# Patient Record
Sex: MALE | Race: WHITE
[De-identification: names, ages, dates, MRNs, and addresses within clinical notes are randomized per-mention and may not be internally consistent; named-entity substitution may affect disease eponyms.]

---

## 2018-01-09 ENCOUNTER — HOSPITAL ENCOUNTER (OUTPATIENT)
Dept: HOSPITAL 96 - M.CRD | Age: 58
End: 2018-01-09
Attending: INTERNAL MEDICINE
Payer: COMMERCIAL

## 2018-01-09 DIAGNOSIS — I08.3: Primary | ICD-10-CM

## 2018-01-09 NOTE — 2DMMODE
[place] [address]
Phone:  [phone] 2 D/M-MODE ECHOCARDIOGRAM     
_______________________________________________________________________________
 
Name:         LOUIS LEO          Room:                     REG CLI
M.RBrock#:    [id number]     Account #:     [id number]  
Admission:    18    Attend Phys:   Mac Hutchison,
Discharge:                Date of Birth: [birth year]  
Date of Service: 18 1611  Report #:      [id number]
        [id number]
_______________________________________________________________________________
THIS REPORT FOR:  //name//                      
 
 
--------------- APPROVED REPORT --------------
 
 
Study performed:  2018 09:10:22
 
EXAM: Comprehensive 2D, Doppler, and color-flow 
Echocardiogram 
Patient Location: Out-Patient   
      Status:  routine
 
      BSA:         1.90
HR: 84 bpm BP:          140/80 mmHg 
 
Other Information 
Study Quality: Good
 
Indications
Aortic Insuff
 
2D Dimensions
   LVEF(%):  61.14 (&gt;50%)
IVSd:  10.96 (7-11mm) LVOT Diam:  19.98 (18-24mm) 
LVDd:  44.19 mm  
PWd:  11.75 (7-11mm) Ascending Ao:  31.96 (22-36mm)
LVDs:  29.80 (25-40mm) 
Aortic Root:  26.26 mm 
   Hilton's LVEF:  61.14 %
 
Volumes
Left Atrial Volume (Systole) 
    LA ESV Index:  14.10 mL/m2
 
Aortic Valve
AoV Peak Krish.:  1.06 m/s 
AO Peak Gr.:  4.47 mmHg  LVOT Max P.05 mmHg
AO Mean Gr.:  2.48 mmHg  LVOT Mean P.10 mmHg
    LVOT Max V:  0.72 m/s
AO V2 VTI:  20.72 cm  LVOT Mean V:  0.49 m/s
JODIE (VTI):  2.47 cm2  LVOT V1 VTI:  16.36 cm
AI Manati:  4.41 m/s2  
AI PHT:  356.87 ms  
 
Mitral Valve
 
 
[place] [address]
Phone:  [phone]                     2 D/M-MODE ECHOCARDIOGRAM     
_______________________________________________________________________________
 
Name:         LOUIS LEO          Room:                     REG CLI
M.R.#:    [id number]     Account #:     [id number]  
Admission:    18    Attend Phys:   Mac Hutchison,
Discharge:                Date of Birth: [birth year]  
Date of Service: 18 1611  Report #:      [id number]
        [id number]
_______________________________________________________________________________
    E/A Ratio:  1.10
    MV Decel. Time:  136.75 ms
MV E Max Krish.:  0.53 m/s 
MV PHT:  39.66 ms  
MVA (PHT):  5.55 cm2  
 
TDI
E/Lateral E':  5.30 E/Medial E':  5.30
   Medial E' Krish.:  0.10 m/s
   Lateral E' Krish.:  0.10 m/s
 
Pulmonary Valve
PV Peak Krish.:  0.65 m/s PV Peak Gr.:  1.70 mmHg
 
Tricuspid Valve
TR Peak Gr.:  19.15 mmHg RVSP:  24.15 mmHg
 
Left Ventricle
The left ventricle is normal size. There is normal LV segmental wall 
motion. Mild concentric left ventricular hypertrophy. Left 
ventricular systolic function is normal. LVEF is 55-60%. Transmitral 
Doppler flow pattern suggests impaired LV relaxation.
 
Right Ventricle
The right ventricle is normal size. The right ventricular systolic 
function is normal.
 
Atria
The left atrium size is normal. The right atrium size is 
normal.
 
Aortic Valve
The aortic valve is normal in structure. Moderate aortic 
regurgitation. There is no aortic valvular stenosis.
 
Mitral Valve
The mitral valve is normal in structure. Trace mitral regurgitation. 
No evidence of mitral valve stenosis.
 
Tricuspid Valve
The tricuspid valve is normal in structure. Mild tricuspid 
regurgitation. The RVSP is __24.1_____ mmHg.
 
Pulmonic Valve
The pulmonary valve is normal in structure. There is no pulmonic 
valvular regurgitation.
 
 
[place] [address]
Phone:  [phone]                     2 D/M-MODE ECHOCARDIOGRAM     
_______________________________________________________________________________
 
Name:         LOUIS LEO TWAN          Room:                     REG CLI
MBrockBrock#:    [id number]     Account #:     [id number]  
Admission:    18    Attend Phys:   Mac Hutchison,
Discharge:                Date of Birth: [birth year]  
Date of Service: 18 1611  Report #:      [id number]
        [id number]
_______________________________________________________________________________
 
Great Vessels
The aortic root is normal in size. IVC is normal in size and 
collapses with &gt;50% inspiration
 
Pericardium
There is no pericardial effusion.
 
&lt;Conclusion&gt;
The left ventricle is normal size.
Mild concentric left ventricular hypertrophy.
Left ventricular systolic function is normal.
LVEF is 55-60%.
Transmitral Doppler flow pattern suggests impaired LV 
relaxation.
Moderate aortic regurgitation.
Trace mitral regurgitation.
Mild tricuspid regurgitation.
The RVSP is __24.1_____ mmHg.
 
 
 
 
 
 
 
 
 
 
 
 
 
 
 
 
 
 
 
 
 
 
 
 
 
  <ELECTRONICALLY SIGNED>
                                           By: Mac Hutchison MD, FACC   
  18
D: 18   _____________________________________
T: 18   Mac Hutchison MD, FACC     /INF

## 2018-01-23 ENCOUNTER — HOSPITAL ENCOUNTER (OUTPATIENT)
Dept: HOSPITAL 96 - M.CRD | Age: 58
End: 2018-01-23
Attending: INTERNAL MEDICINE
Payer: COMMERCIAL

## 2018-01-23 DIAGNOSIS — R07.2: Primary | ICD-10-CM

## 2018-01-29 NOTE — TST
[place] [address]
Phone:  [phone]                     TREADMILL STRESS TEST         
_______________________________________________________________________________
 
Name:         LOUIS LEO          Room:                     REG CLI
M.R.#:    [id number]     Account #:     [id number]  
Admission:    01/23/18    Attend Phys:   Mac Hutchison,
Discharge:                Date of Birth: [birth year]  
Date of Service: 01/23/18 1538  Report #:      [id number]
        [id number]     
_______________________________________________________________________________
THIS REPORT FOR:  //name//                      
 
CC: Eugenio Damon
 
DATE OF SERVICE:  01/23/2018
 
 
REFERRING PHYSICIAN:  Dr. Perea and Dr. Hutchison.
 
INDICATIONS:  Chest pain.
 
Standard Noe protocol exercise stress test.
 
The patient exercised for 10 minutes and 3 seconds to a maximum heart rate of
167 beats per minute.  The patient achieved 103% maximum predicted heart rate
and an energy expenditure equivalent to 12.62 METS.
 
The baseline blood pressure was 168/88 mmHg with a resting pulse rate of 82
beats per minute.  At peak stress, the blood pressure was 194/84 mmHg with peak
stress heart rate of 167 beats per minute.  In recovery, the blood pressure was
139/77 with a recovery heart rate of 94 beats per minute.
 
Exercise was discontinued due to dyspnea and fatigue.  The patient had no chest
discomfort with exercise.
 
The baseline 12-lead electrocardiogram showed sinus rhythm without significant
ST or T-wave abnormality.  EKGs obtained during and post-exercise showed sinus
rhythm and sinus tachycardia with at first upsloping and then downsloping ST
segment depression at peak exercise of approximately 2 to 2.5 mm.  The ST
segment depression resolved within 1 minute of recovery.  Recovery EKGs were
unremarkable.
 
IMPRESSIONS:
1.  Clinical response, nonischemic.
2.  EKG response ischemic.  The patient did develop some ST segment depression
at peak exercise that resolved quickly in recovery.  Whether this represents
true ischemia or ST segment changes due to elevated blood pressure with exercise
is uncertain.  Consider additional stress testing with alternate imaging
modality.
 
 
 
  <ELECTRONICALLY SIGNED>
                                           By: Mac Hutchison MD, FACC   
  01/29/18     0833
D: 01/23/18 1538   _____________________________________
T: 01/23/18 2218   Mac Hutchison MD, FACC     /nt

## 2018-08-28 ENCOUNTER — HOSPITAL ENCOUNTER (OUTPATIENT)
Dept: HOSPITAL 96 - M.RAD | Age: 58
End: 2018-08-28
Attending: INTERNAL MEDICINE
Payer: COMMERCIAL

## 2018-08-28 DIAGNOSIS — M79.642: Primary | ICD-10-CM

## 2018-08-28 DIAGNOSIS — G89.29: ICD-10-CM

## 2019-10-28 ENCOUNTER — HOSPITAL ENCOUNTER (OUTPATIENT)
Dept: HOSPITAL 96 - M.LAB | Age: 59
End: 2019-10-28
Attending: ANESTHESIOLOGY
Payer: COMMERCIAL

## 2019-10-28 DIAGNOSIS — E87.6: Primary | ICD-10-CM

## 2021-01-28 ENCOUNTER — HOSPITAL ENCOUNTER (EMERGENCY)
Dept: HOSPITAL 96 - M.ERS | Age: 61
Discharge: HOME | End: 2021-01-28
Payer: COMMERCIAL

## 2021-01-28 VITALS — WEIGHT: 164.99 LBS | HEIGHT: 69 IN | BODY MASS INDEX: 24.44 KG/M2

## 2021-01-28 VITALS — SYSTOLIC BLOOD PRESSURE: 126 MMHG | DIASTOLIC BLOOD PRESSURE: 69 MMHG

## 2021-01-28 DIAGNOSIS — R42: Primary | ICD-10-CM

## 2021-01-28 DIAGNOSIS — K21.9: ICD-10-CM

## 2021-01-28 LAB
ABSOLUTE BASOPHILS: 0 THOU/UL (ref 0–0.2)
ABSOLUTE EOSINOPHILS: 0.3 THOU/UL (ref 0–0.7)
ABSOLUTE MONOCYTES: 0.4 THOU/UL (ref 0–1.2)
ALBUMIN SERPL-MCNC: 4 G/DL (ref 3.4–5)
ALP SERPL-CCNC: 90 U/L (ref 46–116)
ALT SERPL-CCNC: 37 U/L (ref 30–65)
ANION GAP SERPL CALC-SCNC: 8 MMOL/L (ref 7–16)
APTT BLD: 26.1 SECONDS (ref 25–31.3)
AST SERPL-CCNC: 14 U/L (ref 15–37)
BASOPHILS NFR BLD AUTO: 0.5 %
BILIRUB SERPL-MCNC: 1.1 MG/DL
BUN SERPL-MCNC: 20 MG/DL (ref 7–18)
CALCIUM SERPL-MCNC: 8.9 MG/DL (ref 8.5–10.1)
CHLORIDE SERPL-SCNC: 103 MMOL/L (ref 98–107)
CK-MB MASS: 0.5 NG/ML
CO2 SERPL-SCNC: 31 MMOL/L (ref 21–32)
CREAT SERPL-MCNC: 1.3 MG/DL (ref 0.6–1.3)
EOSINOPHIL NFR BLD: 5.5 %
GLUCOSE SERPL-MCNC: 106 MG/DL (ref 70–99)
GRANULOCYTES NFR BLD MANUAL: 60.5 %
HCT VFR BLD CALC: 44.3 % (ref 42–52)
HGB BLD-MCNC: 15.5 GM/DL (ref 14–18)
INR PPP: 1
LIPASE: 104 U/L (ref 73–393)
LYMPHOCYTES # BLD: 1.4 THOU/UL (ref 0.8–5.3)
LYMPHOCYTES NFR BLD AUTO: 25.7 %
MAGNESIUM SERPL-MCNC: 2.1 MG/DL (ref 1.8–2.4)
MCH RBC QN AUTO: 32 PG (ref 26–34)
MCHC RBC AUTO-ENTMCNC: 35.1 G/DL (ref 28–37)
MCV RBC: 91.1 FL (ref 80–100)
MONOCYTES NFR BLD: 7.8 %
MPV: 8.5 FL. (ref 7.2–11.1)
NEUTROPHILS # BLD: 3.4 THOU/UL (ref 1.6–8.1)
NT-PRO BRAIN NAT PEPTIDE: 56 PG/ML (ref ?–300)
NUCLEATED RBCS: 0 /100WBC
PLATELET COUNT*: 161 THOU/UL (ref 150–400)
POTASSIUM SERPL-SCNC: 3.6 MMOL/L (ref 3.5–5.1)
PROT SERPL-MCNC: 7 G/DL (ref 6.4–8.2)
PROTHROMBIN TIME: 10.4 SECONDS (ref 9.2–11.5)
RBC # BLD AUTO: 4.86 MIL/UL (ref 4.5–6)
RDW-CV: 14.2 % (ref 10.5–14.5)
SODIUM SERPL-SCNC: 142 MMOL/L (ref 136–145)
WBC # BLD AUTO: 5.6 THOU/UL (ref 4–11)

## 2021-01-28 NOTE — EKG
[place] [address]
Phone:  [phone]                     ELECTROCARDIOGRAM REPORT      
_______________________________________________________________________________
 
Name:         FELIPALOUIS TWAN          Room:                     REG ER 
M.R.#:    [id number]     Account #:     [id number]  
Admission:    21    Attend Phys:                     
Discharge:                Date of Birth: [birth year]  
Date of Service: 21 1440  Report #:      [id number]
        [id number]
_______________________________________________________________________________
THIS REPORT FOR:  //name//                      
 
                         [facility] ED
                                       
Test Date:    2021               Test Time:    14:40:10
Pat Name:     LOUIS LEO       Department:   
Patient ID:   [id number]            Room:          
Gender:                               Technician:   [facility]
:          1960               Requested By: Tee Lackey
Order Number: [id number] MD:   Mac Hutchison
                                 Measurements
Intervals                              Axis          
Rate:         81                       P:            52
SD:           189                      QRS:          45
QRSD:         99                       T:            50
QT:           352                                    
QTc:          409                                    
                           Interpretive Statements
Sinus rhythm
Possible left atrial enlargement
Compared to ECG 10/03/2016 21:46:22
ST (T wave) deviation no longer present
Electronically Signed On 2021 16:05:39 CST by Mac Hutchison
[email or web address]
 
 
 
 
 
 
 
 
 
 
 
 
 
 
 
 
 
 
 
 
  <ELECTRONICALLY SIGNED>
                                           By: Mac Hutchison MD, FACC   
  21     1605
D: 21 1440   _____________________________________
T: 21 1440   Mac Hutchison MD, [facility]     /EPI

## 2021-06-07 ENCOUNTER — HOSPITAL ENCOUNTER (OUTPATIENT)
Dept: HOSPITAL 96 - M.CRD | Age: 61
End: 2021-06-07
Attending: INTERNAL MEDICINE
Payer: COMMERCIAL

## 2021-06-07 DIAGNOSIS — I08.0: Primary | ICD-10-CM

## 2021-06-07 NOTE — 2DMMODE
[place] [address]
Phone:  [phone] 2 D/M-MODE ECHOCARDIOGRAM     
_______________________________________________________________________________
 
Name:         LEO,ROBERT TWAN          Room:                     REG CLI
M.R.#:    [id number]     Account #:     [id number]  
Admission:    21    Attend Phys:   Mac Hutchison,
Discharge:                Date of Birth: [birth year]  
Date of Service: 21 1250  Report #:      [id number]
        [id number]
_______________________________________________________________________________
THIS REPORT FOR:
 
cc:  Raúl Carter MD, Dean L. MD Holkins,Eugenio ATWOOD MD [facility]       
                                                                       ~
 
--------------- APPROVED REPORT --------------
 
 
Study performed:  2021 10:56:38
 
EXAM: Comprehensive 2D, Doppler, and color-flow 
Echocardiogram 
Patient Location: Out-Patient   
 
      BSA:         1.92
HR: 68 bpm BP:          130/70 mmHg 
 
Other Information 
Study Quality: Good
 
Indications
Aortic Valve Insuff.
 
2D Dimensions
IVSd:  10.64 (7-11mm) LVOT Diam:  20.36 (18-24mm) 
LVDd:  48.88 mm  
PWd:  10.22 (7-11mm) Ascending Ao:  31.28 (22-36mm)
LVDs:  28.70 (25-40mm) 
Aortic Root:  26.83 mm 
 
Volumes
Left Atrial Volume (Systole) 
    LA ESV Index:  17.80 mL/m2
 
Aortic Valve
AoV Peak Krish.:  1.18 m/s 
AO Peak Gr.:  5.56 mmHg  LVOT Max PG:  3.47 mmHg
AO Mean Gr.:  2.56 mmHg  LVOT Mean P.46 mmHg
    LVOT Max V:  0.93 m/s
AO V2 VTI:  21.47 cm  LVOT Mean V:  0.54 m/s
JODIE (VTI):  3.05 cm2  LVOT V1 VTI:  20.12 cm
AI McDonald:  4.14 m/s2  
AI PHT:  347.36 ms  
 
 
 
[place] [address]
Phone:  [phone]                     2 D/M-MODE ECHOCARDIOGRAM     
_______________________________________________________________________________
 
Name:         LOUIS LEO          Room:                     REG CLI
M.R.#:    [id number]     Account #:     [id number]  
Admission:    21    Attend Phys:   Mac Hutchison,
Discharge:                Date of Birth: [birth year]  
Date of Service: 21 1250  Report #:      [id number]
        [id number]
_______________________________________________________________________________
Mitral Valve
    E/A Ratio:  1.90
    MV Decel. Time:  149.18 ms
MV E Max Krish.:  0.91 m/s 
MV PHT:  43.26 ms  
MVA (PHT):  5.09 cm2  
 
TDI
E/Lateral E':  8.27 E/Medial E':  8.27
   Medial E' Krish.:  0.11 m/s
   Lateral E' Krish.:  0.11 m/s
 
Pulmonary Valve
PV Peak Krish.:  0.60 m/s PV Peak Gr.:  1.45 mmHg
 
Left Ventricle
The left ventricle is normal size. There is normal LV segmental wall 
motion. There is normal left ventricular wall thickness. Left 
ventricular systolic function is normal. The left ventricular 
ejection fraction is within the normal range. LVEF is 55-60%. The 
left ventricular diastolic function is normal.
 
Right Ventricle
The right ventricle is normal size. The right ventricular systolic 
function is normal.
 
Atria
The left atrium size is normal. The right atrium size is 
normal.
 
Aortic Valve
Mild aortic valve sclerosis. Moderate aortic regurgitation. There is 
no aortic valvular stenosis.
 
Mitral Valve
The mitral valve is normal in structure. Mild to moderate mitral 
regurgitation. No evidence of mitral valve stenosis.
 
Tricuspid Valve
The tricuspid valve is normal in structure. There is no tricuspid 
valve regurgitation noted.
 
Pulmonic Valve
The pulmonary valve is normal in structure. There is no pulmonic 
valvular regurgitation.
 
 
 
[place] [address]
Phone:  [phone]                     2 D/M-MODE ECHOCARDIOGRAM     
_______________________________________________________________________________
 
Name:         LEOLOUIS STILES          Room:                     REG CLI
M.R.#:    [id number]     Account #:     [id number]  
Admission:    21    Attend Phys:   Mac Hutchison,
Discharge:                Date of Birth: [birth year]  
Date of Service: 21 1250  Report #:      [id number]
        [id number]
_______________________________________________________________________________
Great Vessels
The aortic root is normal in size. IVC is normal in size and 
collapses >50% with inspiration.
 
Pericardium
There is no pericardial effusion.
 
<Conclusion>
There is normal left ventricular wall thickness.
Left ventricular systolic function is normal.
The left ventricular ejection fraction is within the normal 
range.
LVEF is 55-60%.
The left ventricular diastolic function is normal.
The right ventricle is normal size.
The left atrium size is normal.
Mild aortic valve sclerosis.
Moderate aortic regurgitation.
There is no aortic valvular stenosis.
The mitral valve is normal in structure.
Mild to moderate mitral regurgitation.
The tricuspid valve is normal in structure.
IVC is normal in size and collapses >50% with inspiration.
There is no pericardial effusion.
There is normal LV segmental wall motion.
 
 
 
 
 
 
 
 
 
 
 
 
 
 
 
 
 
 
 
  <ELECTRONICALLY SIGNED>
                                           By: Eugenio Gonzalez MD, FACC     
  21     1250
D: 21 1250   _____________________________________
T: 21 1250   Eugenio Gonzalez MD, FACC       /INF

## 2021-08-06 ENCOUNTER — HOSPITAL ENCOUNTER (OUTPATIENT)
Dept: HOSPITAL 96 - M.LAB | Age: 61
End: 2021-08-06
Attending: INTERNAL MEDICINE
Payer: COMMERCIAL

## 2021-08-06 DIAGNOSIS — R07.9: Primary | ICD-10-CM

## 2021-08-06 LAB
ABSOLUTE BASOPHILS: 0 THOU/UL (ref 0–0.2)
ABSOLUTE EOSINOPHILS: 0.3 THOU/UL (ref 0–0.7)
ABSOLUTE MONOCYTES: 0.5 THOU/UL (ref 0–1.2)
ALBUMIN SERPL-MCNC: 3.9 G/DL (ref 3.4–5)
ALP SERPL-CCNC: 76 U/L (ref 46–116)
ALT SERPL-CCNC: 27 U/L (ref 30–65)
ANION GAP SERPL CALC-SCNC: 7 MMOL/L (ref 7–16)
AST SERPL-CCNC: 15 U/L (ref 15–37)
BASOPHILS NFR BLD AUTO: 0.8 %
BILIRUB SERPL-MCNC: 1.3 MG/DL
BUN SERPL-MCNC: 20 MG/DL (ref 7–18)
CALCIUM SERPL-MCNC: 8.5 MG/DL (ref 8.5–10.1)
CHLORIDE SERPL-SCNC: 106 MMOL/L (ref 98–107)
CO2 SERPL-SCNC: 29 MMOL/L (ref 21–32)
CREAT SERPL-MCNC: 1.2 MG/DL (ref 0.6–1.3)
EOSINOPHIL NFR BLD: 4.9 %
GLUCOSE SERPL-MCNC: 106 MG/DL (ref 70–99)
GRANULOCYTES NFR BLD MANUAL: 55.9 %
HCT VFR BLD CALC: 40.9 % (ref 42–52)
HGB BLD-MCNC: 14.4 GM/DL (ref 14–18)
LYMPHOCYTES # BLD: 1.5 THOU/UL (ref 0.8–5.3)
LYMPHOCYTES NFR BLD AUTO: 28.5 %
MCH RBC QN AUTO: 31.8 PG (ref 26–34)
MCHC RBC AUTO-ENTMCNC: 35.2 G/DL (ref 28–37)
MCV RBC: 90.3 FL (ref 80–100)
MONOCYTES NFR BLD: 9.9 %
MPV: 8.5 FL. (ref 7.2–11.1)
NEUTROPHILS # BLD: 2.9 THOU/UL (ref 1.6–8.1)
NUCLEATED RBCS: 0 /100WBC
PLATELET COUNT*: 149 THOU/UL (ref 150–400)
POTASSIUM SERPL-SCNC: 4.4 MMOL/L (ref 3.5–5.1)
PROT SERPL-MCNC: 6.7 G/DL (ref 6.4–8.2)
RBC # BLD AUTO: 4.53 MIL/UL (ref 4.5–6)
RDW-CV: 13.9 % (ref 10.5–14.5)
SODIUM SERPL-SCNC: 142 MMOL/L (ref 136–145)
TROPONIN-I LEVEL: <0.06 NG/ML (ref ?–0.06)
WBC # BLD AUTO: 5.1 THOU/UL (ref 4–11)

## 2021-08-25 ENCOUNTER — HOSPITAL ENCOUNTER (OUTPATIENT)
Dept: HOSPITAL 96 - M.CT | Age: 61
End: 2021-08-25
Attending: INTERNAL MEDICINE
Payer: COMMERCIAL

## 2021-08-25 DIAGNOSIS — M51.37: ICD-10-CM

## 2021-08-25 DIAGNOSIS — K40.90: Primary | ICD-10-CM

## 2021-09-09 ENCOUNTER — HOSPITAL ENCOUNTER (OUTPATIENT)
Dept: HOSPITAL 96 - M.LAB | Age: 61
End: 2021-09-09
Payer: COMMERCIAL

## 2021-09-09 DIAGNOSIS — E87.6: Primary | ICD-10-CM

## 2021-11-15 NOTE — CARDNUC
[place] [address]
Phone:  [phone]                     CARDIAC NUCLEAR IMAGING REPORT
_______________________________________________________________________________
 
Name:         LOUIS LEO    Room:                     REG CLI
M.R.#:    [id number]     Account #:     [id number]  
Admission:    11/15/21    Attend Phys:   Mac Hutchison,
Discharge:                Date of Birth: [birth year]  
Date of Service: 11/15/21 1709  Report #:      [id number]
        [id number] 
_______________________________________________________________________________
THIS REPORT FOR:
 
cc:  Raúl Carter MD, Dean L. MD Liston,Mac JONES MD [facility]     
                                                                       ~
 
--------------- APPROVED REPORT --------------
 
 
Study performed:  11/15/2021 09:57:11
 
Exam: Nuclear Stress Test
Indication: Chest pain
Patient Location: Out-Patient
Stress Nurse: Clarisa Horowitz RN
 
Ht: 5 ft 9 in  Wt: 168 lbs  BSA:  1.92 m2
    BMI:  24.80
 
Medical History
Medical History: HTN, Hyperlipidemia, Valvular heart 
disease
Medications: hctz, losartan, rythmol
Allergies: gabapentin
Cardiac Risk Factors: Age, FHX of CAD, HTN, Hyperlipidemia, Past 
Smoker
Exercise History: Physically active
 
Stress Test Details
Stress Test:  Exercise stress testing was performed using a Noe 
protocol.      
HR           
Resting HR:            66 bpm   Max Heart Rate (APMHR): 157 bpm  
Max HR Achieved:  143 bpm   Target HR (85% APMHR): 133 bpm  
% of APMHR:         91         
Recovery HR:            92 bpm        
HR response to stress: Normal HR response to stress      
 
BP           
Resting BP:  154/79 mmHg        
Max BP:       233/69 mmHg        
 
BP response to stress: Abnormal hypertensive response to 
stress.      
ECG           
 
 
[place] [address]
Phone:  [phone]                     CARDIAC NUCLEAR IMAGING REPORT
_______________________________________________________________________________
 
Name:         LOUIS LEO    Room:                     REG CLI
M.R.#:    [id number]     Account #:     [id number]  
Admission:    11/15/21    Attend Phys:   Mac Hutchison,
Discharge:                Date of Birth: [birth year]  
Date of Service: 11/15/21 1709  Report #:      [id number]
        [id number] 
_______________________________________________________________________________
Resting ECG:  Sinus Rhythm        
Stress ECG:     Sinus Tachycardia       
ST Change: Horizontal ST depression       
Maximum ST Deviation: 0.5 mm        
Arrhythmia:    None         
Recovery ECG: Sinus Rhythm        
Recovery ST Change: Horizontal ST depression      
Recovery ST Deviation: 0.5 mm        
Recovery Arrhythmia: None        
 
Clinical
Reason for Termination: Maximal effort, Fatigue
Exercise duration: 9 min 0 sec
Exercise capacity: 10.16 METs
Functional Aerobic Impairment  91%
The patient tolerated standard Noe protocol exercise without 
significant cardiac symptoms.
 
Stress ECG Conclusion
Baseline twelve-lead EKG shows sinus rhythm without significant ST 
segment abnormality.  EKGs obtained during and post exercise show 
sinus rhythm and sinus tachycardia with 0.5 mm horizontal ST segment 
depression that does not meet diagnostic criteria for ischemia.
 
NM EXAM: Myocardial Perfusion REST/STRESS
 
Resting Data
Rest SPECT myocardial perfusion imaging was performed in supine 
position 30 minutes following the intravenous injection of 9.9 mCi of 
Tc-99m Sestamibi.
Time of rest injection: 08:55     
The images were gated to evaluate regional wall motion and calculate 
left ventricular ejection fraction. 
Administration Route: IV
Administration Site: Right AC
 
Exercise Stress
At peak stress, the patient was injected intravenously with 34.4mCi 
of Tc-99m Sestamibi.
Time of stress injection: 10:15     
Administration Route: IV
Administration Site: Right AC
Heart Rate at time of stress injection: 143 bpm.
Patient continued to exercise for 1.5 minute(s).
Gated Stress SPECT was performed 30 minutes after stress 
injection.
 
 
[place] [address]
Phone:  [phone]                     CARDIAC NUCLEAR IMAGING REPORT
_______________________________________________________________________________
 
Name:         LOUIS LEO    Room:                     REG CLI
M.R.#:    [id number]     Account #:     [id number]  
Admission:    11/15/21    Attend Phys:   Mac Hutchison,
Discharge:                Date of Birth: [birth year]  
Date of Service: 11/15/21 1709  Report #:      [id number]
        [id number] 
_______________________________________________________________________________
The images were gated to evaluate regional wall motion and calculate 
left ventricular ejection fraction. 
Prone imaging was performed.
 
Study Quality
Study: Good
Artifact: Mild Diaphragmatic artifact
 
Study Data
At rest, the left ventricular ejection fraction was 55%..   
Post stress, the left ventricular ejection was 61%..   
TID = 0.94.       
 
Perfusion
Perfusion images obtained in the supine position at rest and post 
exercise stress show photopenia of the inferior wall that is more 
pronounced on resting and stress images consistent with diaphragmatic 
attenuation artifact.  The inferior wall photopenia resolves 
partially with prone imaging.  No reversible defects are 
identified.
 
Wall Motion
Normal left ventricular wall motion.
 
Nuclear Conclusion
ECG Findings: negative for ischemia
Clinical Findings: negative for ischemia
Nuclear Findings: negative for ischemia
Exercise Capacity: normal
Left Ventricular Function: normal
Risk Study: low
Perfusion images show no reversible defect to suggest ischemia.  
There is moderate diaphragmatic attenuation artifact noted.  Global 
LV systolic function is normal.  This is a low risk study. 
 
<Conclusion>
Baseline twelve-lead EKG shows sinus rhythm without significant ST 
segment abnormality.  EKGs obtained during and post exercise show 
sinus rhythm and sinus tachycardia with 0.5 mm horizontal ST segment 
depression that does not meet diagnostic criteria for ischemia.
 
 
 
 
  <ELECTRONICALLY SIGNED>
                                           By: Mac Hutchison MD, FACC   
  11/15/21     1709
D: 11/15/21 1709   _____________________________________
T: 11/15/21 1709   Mac Hutchison MD, FACC     /INF